# Patient Record
Sex: MALE | Race: WHITE
[De-identification: names, ages, dates, MRNs, and addresses within clinical notes are randomized per-mention and may not be internally consistent; named-entity substitution may affect disease eponyms.]

---

## 2019-02-17 ENCOUNTER — HOSPITAL ENCOUNTER (EMERGENCY)
Dept: HOSPITAL 62 - ER | Age: 29
LOS: 1 days | Discharge: HOME | End: 2019-02-18
Payer: SELF-PAY

## 2019-02-17 DIAGNOSIS — R42: ICD-10-CM

## 2019-02-17 DIAGNOSIS — F17.200: ICD-10-CM

## 2019-02-17 DIAGNOSIS — R20.0: Primary | ICD-10-CM

## 2019-02-17 PROCEDURE — 99284 EMERGENCY DEPT VISIT MOD MDM: CPT

## 2019-02-17 PROCEDURE — 93005 ELECTROCARDIOGRAM TRACING: CPT

## 2019-02-17 PROCEDURE — 93010 ELECTROCARDIOGRAM REPORT: CPT

## 2019-02-18 VITALS — SYSTOLIC BLOOD PRESSURE: 105 MMHG | DIASTOLIC BLOOD PRESSURE: 64 MMHG
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
## 2019-02-18 NOTE — EKG REPORT
SEVERITY:- OTHERWISE NORMAL ECG -

SINUS RHYTHM

RIGHT AXIS DEVIATION

:

Confirmed by: Denisha Swenson MD 18-Feb-2019 01:18:31

## 2019-12-16 ENCOUNTER — HOSPITAL ENCOUNTER (EMERGENCY)
Dept: HOSPITAL 62 - ER | Age: 29
Discharge: HOME | End: 2019-12-16
Payer: SELF-PAY

## 2019-12-16 VITALS — DIASTOLIC BLOOD PRESSURE: 72 MMHG | SYSTOLIC BLOOD PRESSURE: 114 MMHG

## 2019-12-16 DIAGNOSIS — T78.40XA: ICD-10-CM

## 2019-12-16 DIAGNOSIS — F17.200: ICD-10-CM

## 2019-12-16 DIAGNOSIS — R21: Primary | ICD-10-CM

## 2019-12-16 DIAGNOSIS — Z88.5: ICD-10-CM

## 2019-12-16 DIAGNOSIS — Z88.8: ICD-10-CM

## 2019-12-16 DIAGNOSIS — Z88.6: ICD-10-CM
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
## 2019-12-16 DIAGNOSIS — Z91.030: ICD-10-CM

## 2019-12-16 DIAGNOSIS — R11.0: ICD-10-CM

## 2019-12-16 PROCEDURE — 99283 EMERGENCY DEPT VISIT LOW MDM: CPT

## 2019-12-16 NOTE — ER DOCUMENT REPORT
ED Allergic Reaction





- General


Chief Complaint: Allergy Symptoms


Stated Complaint: POSSIBLE ALLERGIC REACTION


Time Seen by Provider: 12/16/19 05:27


Primary Care Provider: 


ALYSSA CHO MD [ACTIVE STAFF] - Follow up in 3-5 days


Notes: 





29-year-old male presents with generalized rash that started Friday.  Patient 

states he just recently started omeprazole.  Patient denies any dyspnea or 

lip/tongue/throat swelling.  Patient states he has some nausea.


TRAVEL OUTSIDE OF THE [place] IN LAST 30 DAYS: No





- Related Data


Allergies/Adverse Reactions: 


                                        





hydrocodone Allergy (Verified 07/26/17 14:12)


   


rosina hips Allergy (Verified 07/26/17 14:12)


   


Bee Sting Allergy (Uncoded 07/26/17 14:12)


   








Home Medications: omeprazole





Past Medical History





- Social History


Smoking Status: Current Every Day Smoker


Chew tobacco use (# tins/day): No


Frequency of alcohol use: drinks nightly


Family History: Reviewed & Not Pertinent


Patient has suicidal ideation: No


Patient has homicidal ideation: No


Renal/ Medical History: Denies: Hx Peritoneal Dialysis


Musculoskeletal Medical History: Reports Hx Musculoskeletal Deformity, Reports 

Hx Musculoskeletal Trauma


Psychiatric Medical History: Reports: Hx Anxiety, Hx Attention Deficit 

Hyperactivity Disorder, Hx Bipolar Disorder, Hx Depression


Traumatic Medical History: Reports: Hx Fractures - coccyx


Past Surgical History: Reports: Hx Orthopedic Surgery





- Immunizations


Immunizations up to date: Yes


Hx Diphtheria, Pertussis, Tetanus Vaccination: Yes





Review of Systems





- Review of Systems


Notes: 





Constitutional: Negative for fever.


HENT: Negative for sore throat.


Eyes: Negative for visual changes.


Cardiovascular: Negative for chest pain.


Respiratory: Negative for shortness of breath.


Gastrointestinal: Negative for abdominal pain, vomiting or diarrhea.


Genitourinary: Negative for dysuria.


Musculoskeletal: Negative for back pain.


Skin: Positive for rash.


Neurological: Negative for headaches, weakness or numbness.





10 point ROS negative except as marked above and in HPI.





Physical Exam





- Vital signs


Vitals: 


                                        











Temp Pulse Resp BP Pulse Ox


 


 97.1 F   88   18   126/77 H  98 


 


 12/16/19 00:17  12/16/19 00:17  12/16/19 00:17  12/16/19 00:17  12/16/19 00:17














- Notes


Notes: 





GENERAL: Well-appearing, well-nourished and in no acute distress.


HEAD: Atraumatic, normocephalic.


EYES: Extraocular movements intact, sclera anicteric, conjunctiva are normal.


NECK: Normal range of motion, supple without lymphadenopathy or JVD.


LUNGS: Breath sounds clear to auscultation bilaterally and equal.  No wheezes 

rales or rhonchi.


HEART: Regular rate and rhythm without murmurs, rubs or gallops.


ABDOMEN: Soft, nontender.  No guarding, no rebound.  No masses appreciated.


EXTREMITIES: Normal range of motion, no pitting or edema.  No clubbing or 

cyanosis.


NEUROLOGICAL: Cranial nerves II through XII grossly intact.  Normal speech, 

normal gait.


PSYCH: Normal mood, normal affect.


SKIN: Rash noted to abdomen.  Rash appears urticarial/hives.





Course





- Re-evaluation


Re-evalutation: 





12/16/19 possible allergic reaction noted to abdomen.  Nontoxic, well-appearing.

 Patient recently started omeprazole.  Patient speaks full sentences without 

difficulty.  No tongue/throat/lip swelling.  No accessory muscle use.  Patient 

given prednisone prescription and instructed to take over-the-counter 

antihistamine.  Patient given strict return precautions.  Patient instructed to 

stop omeprazole and given prescription for Protonix instead.  Patient given 

follow-up with PCP.  All questions/concerns addressed prior to discharge.








- Vital Signs


Vital signs: 


                                        











Temp Pulse Resp BP Pulse Ox


 


 97.8 F   85   20   114/72   97 


 


 12/16/19 06:35  12/16/19 06:35  12/16/19 06:35  12/16/19 06:35  12/16/19 06:35














Discharge





- Discharge


Clinical Impression: 


 Rash





Allergic reaction


Qualifiers:


 Encounter type: initial encounter Qualified Code(s): T78.40XA - Allergy, 

unspecified, initial encounter





GERD (gastroesophageal reflux disease)


Qualifiers:


 Esophagitis presence: without esophagitis Qualified Code(s): K21.9 - Gastro-

esophageal reflux disease without esophagitis





Condition: Stable


Disposition: HOME, SELF-CARE


Instructions:  Acute Allergic Reaction ([facility]), Corticosteroid Medication ([facility])


Additional Instructions: 


Please take prednisone as prescribed.  Please take Benadryl, Allegra, Claritin, 

or Allegra for itching which is available over-the-counter.  Please follow-up 

with primary care doctor listed in 3 to 5 days.  Please return immediately to ER

if you start having any worsening symptoms, including shortness of breath, lip/

tongue/throat swelling, worsening rash, chest pain, vomiting, or any other 

symptoms that are concerning to you.


Prescriptions: 


Prednisone [Deltasone 20 mg Tablet] 1 tab PO DAILY 5 Days #10 tablet


Pantoprazole Sodium [Protonix 20 mg Dr Tablet] 20 mg PO QAM #14 tablet.dr


Forms:  Return to Work


Referrals: 


ALYSSA CHO MD [ACTIVE STAFF] - Follow up in 3-5 days

## 2020-06-16 ENCOUNTER — HOSPITAL ENCOUNTER (EMERGENCY)
Dept: HOSPITAL 62 - ER | Age: 30
Discharge: HOME | End: 2020-06-16
Payer: SELF-PAY

## 2020-06-16 VITALS — SYSTOLIC BLOOD PRESSURE: 124 MMHG | DIASTOLIC BLOOD PRESSURE: 76 MMHG

## 2020-06-16 DIAGNOSIS — Z88.6: ICD-10-CM

## 2020-06-16 DIAGNOSIS — M79.622: ICD-10-CM

## 2020-06-16 DIAGNOSIS — M62.830: Primary | ICD-10-CM

## 2020-06-16 DIAGNOSIS — M79.621: ICD-10-CM

## 2020-06-16 DIAGNOSIS — Z91.030: ICD-10-CM

## 2020-06-16 DIAGNOSIS — Z88.5: ICD-10-CM

## 2020-06-16 DIAGNOSIS — R20.0: ICD-10-CM

## 2020-06-16 DIAGNOSIS — Z91.048: ICD-10-CM

## 2020-06-16 DIAGNOSIS — M79.604: ICD-10-CM

## 2020-06-16 DIAGNOSIS — M54.2: ICD-10-CM

## 2020-06-16 DIAGNOSIS — M79.605: ICD-10-CM

## 2020-06-16 DIAGNOSIS — Z87.891: ICD-10-CM

## 2020-06-16 LAB
ADD MANUAL DIFF: NO
ALBUMIN SERPL-MCNC: 5 G/DL (ref 3.5–5)
ALP SERPL-CCNC: 91 U/L (ref 38–126)
ANION GAP SERPL CALC-SCNC: 7 MMOL/L (ref 5–19)
APPEARANCE UR: CLEAR
APTT PPP: YELLOW S
AST SERPL-CCNC: 24 U/L (ref 17–59)
BARBITURATES UR QL SCN: NEGATIVE
BASOPHILS # BLD AUTO: 0 10^3/UL (ref 0–0.2)
BASOPHILS NFR BLD AUTO: 0.7 % (ref 0–2)
BILIRUB DIRECT SERPL-MCNC: 0 MG/DL (ref 0–0.4)
BILIRUB SERPL-MCNC: 0.6 MG/DL (ref 0.2–1.3)
BILIRUB UR QL STRIP: NEGATIVE
BUN SERPL-MCNC: 9 MG/DL (ref 7–20)
CALCIUM: 10.2 MG/DL (ref 8.4–10.2)
CHLORIDE SERPL-SCNC: 101 MMOL/L (ref 98–107)
CK SERPL-CCNC: 55 U/L (ref 55–170)
CO2 SERPL-SCNC: 31 MMOL/L (ref 22–30)
EOSINOPHIL # BLD AUTO: 0 10^3/UL (ref 0–0.6)
EOSINOPHIL NFR BLD AUTO: 0.4 % (ref 0–6)
ERYTHROCYTE [DISTWIDTH] IN BLOOD BY AUTOMATED COUNT: 13.8 % (ref 11.5–14)
GLUCOSE SERPL-MCNC: 100 MG/DL (ref 75–110)
GLUCOSE UR STRIP-MCNC: NEGATIVE MG/DL
HCT VFR BLD CALC: 49 % (ref 37.9–51)
HGB BLD-MCNC: 16.5 G/DL (ref 13.5–17)
KETONES UR STRIP-MCNC: NEGATIVE MG/DL
LYMPHOCYTES # BLD AUTO: 1.9 10^3/UL (ref 0.5–4.7)
LYMPHOCYTES NFR BLD AUTO: 30.1 % (ref 13–45)
MCH RBC QN AUTO: 31.3 PG (ref 27–33.4)
MCHC RBC AUTO-ENTMCNC: 33.8 G/DL (ref 32–36)
MCV RBC AUTO: 93 FL (ref 80–97)
METHADONE UR QL SCN: NEGATIVE
MONOCYTES # BLD AUTO: 0.3 10^3/UL (ref 0.1–1.4)
MONOCYTES NFR BLD AUTO: 5.6 % (ref 3–13)
NEUTROPHILS # BLD AUTO: 4 10^3/UL (ref 1.7–8.2)
NEUTS SEG NFR BLD AUTO: 63.2 % (ref 42–78)
NITRITE UR QL STRIP: NEGATIVE
PCP UR QL SCN: NEGATIVE
PH UR STRIP: 6 [PH] (ref 5–9)
PLATELET # BLD: 137 10^3/UL (ref 150–450)
POTASSIUM SERPL-SCNC: 4.1 MMOL/L (ref 3.6–5)
PROT SERPL-MCNC: 8.2 G/DL (ref 6.3–8.2)
PROT UR STRIP-MCNC: NEGATIVE MG/DL
RBC # BLD AUTO: 5.29 10^6/UL (ref 4.35–5.55)
SP GR UR STRIP: 1.01
TOTAL CELLS COUNTED % (AUTO): 100 %
URINE AMPHETAMINES SCREEN: NEGATIVE
URINE BENZODIAZEPINES SCREEN: NEGATIVE
URINE COCAINE SCREEN: NEGATIVE
URINE MARIJUANA (THC) SCREEN: NEGATIVE
UROBILINOGEN UR-MCNC: NEGATIVE MG/DL (ref ?–2)
WBC # BLD AUTO: 6.3 10^3/UL (ref 4–10.5)

## 2020-06-16 PROCEDURE — 96361 HYDRATE IV INFUSION ADD-ON: CPT

## 2020-06-16 PROCEDURE — 80053 COMPREHEN METABOLIC PANEL: CPT

## 2020-06-16 PROCEDURE — 85025 COMPLETE CBC W/AUTO DIFF WBC: CPT

## 2020-06-16 PROCEDURE — 72125 CT NECK SPINE W/O DYE: CPT

## 2020-06-16 PROCEDURE — 80307 DRUG TEST PRSMV CHEM ANLYZR: CPT

## 2020-06-16 PROCEDURE — 96374 THER/PROPH/DIAG INJ IV PUSH: CPT

## 2020-06-16 PROCEDURE — 81001 URINALYSIS AUTO W/SCOPE: CPT

## 2020-06-16 PROCEDURE — 82553 CREATINE MB FRACTION: CPT

## 2020-06-16 PROCEDURE — 82550 ASSAY OF CK (CPK): CPT

## 2020-06-16 PROCEDURE — 36415 COLL VENOUS BLD VENIPUNCTURE: CPT

## 2020-06-16 PROCEDURE — 99284 EMERGENCY DEPT VISIT MOD MDM: CPT

## 2020-06-16 NOTE — RADIOLOGY REPORT (SQ)
EXAM DESCRIPTION:  CT CERVICAL SPINE WITHOUT



IMAGES COMPLETED DATE/TIME:  6/16/2020 5:53 pm



REASON FOR STUDY:  Paresthesia



COMPARISON:  None.



TECHNIQUE:  Axial images acquired through the cervical spine without intravenous contrast.  Images re
viewed with lung, soft tissue and bone windows.  Reconstructed coronal and sagittal MPR images review
ed.  Images stored on PACS.

All CT scanners at this facility use dose modulation, iterative reconstruction, and/or weight based d
osing when appropriate to reduce radiation dose to as low as reasonably achievable (ALARA).

[facility]: Dose Right  [facility]: CareDose    [facility]: Dose Right    [facility]: Teradose 4D    [facility]: Smart [employer]



RADIATION DOSE:  CT Rad equipment meets quality standard of care and radiation dose reduction techniq
ues were employed. CTDIvol: 18.2 mGy. DLP: 464 mGy-cm



LIMITATIONS:  None.



FINDINGS:  ALIGNMENT:  Dextroconvex scoliosis.

MINERALIZATION: Normal.

VERTEBRAL BODIES: No fractures or dislocation.

DISCS: No significant disc disease.

FACETS, LATERAL MASSES, POSTERIOR ELEMENTS: No fractures.  No dislocation.  No acute findings.

HARDWARE: None in the spine.

VISUALIZED RIBS: No fractures.

LUNG APICES AND SOFT TISSUES: No significant or acute findings.

OTHER: No other significant finding.



IMPRESSION:  1.  Dextroconvex scoliosis.

2. No acute osseous findings.

3.  If symptoms persist, additional imaging may be helpful.



TECHNICAL DOCUMENTATION:  JOB ID:  [id number]

Quality ID # [id number]: Final reports with documentation of one or more dose reduction techniques (e.g., Au
tomated exposure control, adjustment of the mA and/or kV according to patient size, use of iterative 
reconstruction technique)

 2011 [employer]- All Rights Reserved



Reading location - IP/workstation name: [email or web address]

## 2020-06-16 NOTE — ER DOCUMENT REPORT
ED General





- General


Chief Complaint: Pain All Over


Stated Complaint: LEFT ARM PAIN,VOMITING,DIARRHEA


Time Seen by Provider: 06/16/20 16:45


Mode of Arrival: Ambulatory


Information source: Patient


Notes: 





29-year-old male with past medical history significant for GERD presents 

emergency room complaining of sharp shooting cramping pain in his legs, upper 

arms, "my whole body" for the past 10 days.  He denies any trauma or injury.  

Denies chest pain, denies any shortness of breath, denies difficulty breathing. 

Has not taken any medications for symptoms.  No recent travel.  No COVID-19 

exposure.  Eating and drinking normally.  States he has pain that radiates from 

the left side of his neck into his left arm.  Complains of numbness to his left 

extremity but denies  weakness.  Works as a  for auto parts store

but denies leaving the county.  No known ill contacts.  No rashes, no known tick

bites.  Patient is right-handed.  Patient drove self to the ER.


TRAVEL OUTSIDE OF THE [place] IN LAST 30 DAYS: No





- Related Data


Allergies/Adverse Reactions: 


                                        





hydrocodone Allergy (Verified 07/26/17 14:12)


   


rosina hips Allergy (Verified 07/26/17 14:12)


   


Bee Sting Allergy (Uncoded 07/26/17 14:12)


   











Past Medical History





- General


Information source: Patient





- Social History


Smoking Status: Former Smoker


Frequency of alcohol use: None


Drug Abuse: None


Family History: Reviewed & Not Pertinent


Patient has homicidal ideation: No


Renal/ Medical History: Denies: Hx Peritoneal Dialysis


Musculoskeletal Medical History: Reports Hx Musculoskeletal Deformity, Reports 

Hx Musculoskeletal Trauma


Psychiatric Medical History: Reports: Hx Anxiety, Hx Attention Deficit 

Hyperactivity Disorder, Hx Bipolar Disorder, Hx Depression


Traumatic Medical History: Reports: Hx Fractures - coccyx


Past Surgical History: Reports: Hx Orthopedic Surgery





- Immunizations


Immunizations up to date: Yes


Hx Diphtheria, Pertussis, Tetanus Vaccination: Yes





Review of Systems





- Review of Systems


Constitutional: No symptoms reported


Cardiovascular: No symptoms reported


Respiratory: No symptoms reported


Gastrointestinal: No symptoms reported


Musculoskeletal: Muscle pain


Skin: No symptoms reported


Neurological/Psychological: Numbness


-: Yes All other systems reviewed and negative





Physical Exam





- Vital signs


Vitals: 


                                        











Temp


 


 98.9 F 


 


 06/16/20 13:42














- General


General appearance: Appears well, Alert


In distress: Mild





- HEENT


Neck: Normal.  No: Anterior cervical chain, Posterior cervical chain, 

Lymphadenopathy


Notes: 





Positive left trapezius muscle spasms palpated.





- Respiratory


Respiratory status: No respiratory distress


Chest status: Nontender


Breath sounds: Normal


Chest palpation: Normal





- Cardiovascular


Rhythm: Regular


Heart sounds: Normal auscultation


Murmur: No





- Abdominal


Inspection: Normal


Distension: No distension


Bowel sounds: Normal


Tenderness: Nontender


Organomegaly: No organomegaly





- Extremities


General upper extremity: Normal inspection, Nontender, Normal color, Normal ROM,

Normal temperature


General lower extremity: Normal inspection, Nontender, Normal color, Normal ROM,

Normal temperature, Normal weight bearing.  No: Mone's sign





- Neurological


Neuro grossly intact: Yes


Cognition: Normal


Orientation: AAOx4


Miguel Coma Scale Eye Opening: Spontaneous


Miguel Coma Scale Verbal: Oriented


Miguel Coma Scale Motor: Obeys Commands


[place] Coma Scale Total: 15


Speech: Normal


Motor strength normal: LUE, RUE, LLE, RLE


Sensory: Normal


Notes: 





 strength is equal and adequate bilaterally.  Full range of motion to all 

extremities.  Positive bilateral radial and popliteal pulses.  Reflexes equal 

and adequate to patella and brachial bilaterally able to determine between light

and painful stimuli to both upper and lower extremities.  Normal sensation.  

Neurovascularly and neurologically intact.





- Skin


Skin Temperature: Warm


Skin Moisture: Dry


Skin Color: Normal





Course





- Re-evaluation


Re-evalutation: 





06/16/20 18:48


Patient is resting comfortably states his pain has resolved.  Reviewed all test 

results with patient.  Counseled on the need to follow-up outpatient with a 

primary care physician for further testing and evaluation of his symptoms.  He 

has no acute distress at this time.  He is ambulatory with a steady gait.  He is

neurovascularly and neurologically intact.  He was counseled he can take Tylenol

and Motrin as needed for pain.  Flexeril as prescribed.  Aware that he cannot 

drive while taking the Flexeril.  Patient was given strict return to the 

emergency room guidelines.  Return for any new or worsening symptoms.  All 

questions were answered.  Patient verbalized understanding and agrees with plan 

of care.


06/16/20 18:49








- Vital Signs


Vital signs: 


                                        











Temp Pulse Resp BP Pulse Ox


 


 98.9 F   65   15   124/76   96 


 


 06/16/20 17:30  06/16/20 17:30  06/16/20 17:30  06/16/20 17:30  06/16/20 17:30














- Laboratory


Result Diagrams: 


                                 06/16/20 17:38





                                 06/16/20 17:38


Laboratory results interpreted by me: 


                                        











  06/16/20 06/16/20





  17:38 17:38


 


Plt Count  137 L 


 


Carbon Dioxide   31 H














- Diagnostic Test


Radiology reviewed: Reports reviewed





Discharge





- Discharge


Clinical Impression: 


 Generalized pain, Muscle spasm





Condition: Stable


Disposition: HOME, SELF-CARE


Instructions:  Myalagia (Muscle Pain) ([facility])


Additional Instructions: 


Tylenol and/or Motrin as needed for pain.  Flexeril as prescribed.  Is important

that you make an appointment with a primary care physician.  On-call physician 

was provided for you.  No driving while taking the Flexeril.  Return for any new

or worsening symptoms.


Prescriptions: 


Cyclobenzaprine HCl [Flexeril 10 mg Tablet] 10 mg PO TIDP PRN #15 tab


 PRN Reason: 


Forms:  Return to Work